# Patient Record
(demographics unavailable — no encounter records)

---

## 2025-03-10 NOTE — ASSESSMENT
[FreeTextEntry1] : EKG SR, voltage criteria for LVH, Poor R-wave progression, nonspecific ST depression Possible IBS

## 2025-03-10 NOTE — HISTORY OF PRESENT ILLNESS
[FreeTextEntry1] : Here for CPE Pt is abdominal bloating  [de-identified] : Former smoker. Social alcohol. Last mammogram 1 year ago. Last GYN check-up 1 year ago. Last colonoscopy 6 years ago. Told to repeat in 10 years.

## 2025-03-10 NOTE — HISTORY OF PRESENT ILLNESS
[FreeTextEntry1] : Here for CPE Pt is abdominal bloating  [de-identified] : Former smoker. Social alcohol. Last mammogram 1 year ago. Last GYN check-up 1 year ago. Last colonoscopy 6 years ago. Told to repeat in 10 years.

## 2025-04-30 NOTE — HISTORY OF PRESENT ILLNESS
[FreeTextEntry1] : This is a 66 year old woman with no medical history who is referred to the office for a cardiac evaluation after being found to have LVH on her EKG at her annual physical.  She reports no cardiac symptoms, denying chest pains, dyspnea, PND, orthopnea, LE swelling, dizziness, or syncope.  She exercises regularly on the treadmill, and reports that she is a pescatarian and eats right.  She notes that her pressure is up and down, but has been higher than normal lately.  She has gained 20 pounds and has not been able to lose them.  She denies P ND, orthopnea, LE swelling, dizziness, or syncope.  She has never seen a cardiologist or had a cardiac evaluation.  Her LDL was noted to be high, and she was told to have it repeated after a few months of lifestyle modifications.

## 2025-04-30 NOTE — DISCUSSION/SUMMARY
[FreeTextEntry1] : This is a 66 year old woman with no medical history who is referred to the office for a cardiac evaluation after being found to have LVH on her EKG at her annual physical.   She needs to have an echocardiogram to assess for LVE or LVH.  We discussed her mildly elevated BP and LDL.  She has gained 20 pounds and not been able to lose them.  WE discussed that she should try to get her LDL and BP down with lifestyle modification and weight loss.  She will reduce her pizza and bagel intake, and try to exercise more.  She will return to the office in 3-6 months for a repeat assessment.  We discussed the benefits of a healthy diet, regular exercise and physical activity, and weight loss in detail.  [EKG obtained to assist in diagnosis and management of assessed problem(s)] : EKG obtained to assist in diagnosis and management of assessed problem(s)